# Patient Record
Sex: FEMALE | NOT HISPANIC OR LATINO | URBAN - METROPOLITAN AREA
[De-identification: names, ages, dates, MRNs, and addresses within clinical notes are randomized per-mention and may not be internally consistent; named-entity substitution may affect disease eponyms.]

---

## 2021-05-20 ENCOUNTER — IMPORTED ENCOUNTER (OUTPATIENT)
Dept: URBAN - METROPOLITAN AREA CLINIC 1 | Facility: CLINIC | Age: 41
End: 2021-05-20

## 2021-05-20 NOTE — PATIENT DISCUSSION
1.  Dry Eye OU -- Recommend ATs PRN. 2.  S/p LASIK OU. Return for an appointment in PR[place] with Dr. Bria Gardner.

## 2022-03-12 ASSESSMENT — KERATOMETRY
OS_AXISANGLE2_DEGREES: 101
OD_K1POWER_DIOPTERS: 44.25
OS_K1POWER_DIOPTERS: 45.00
OD_K2POWER_DIOPTERS: 45.00
OD_AXISANGLE_DEGREES: 172
OS_AXISANGLE_DEGREES: 011
OS_K2POWER_DIOPTERS: 45.50
OD_AXISANGLE2_DEGREES: 082

## 2022-03-12 ASSESSMENT — TONOMETRY
OS_IOP_MMHG: 13
OD_IOP_MMHG: 14

## 2022-03-12 ASSESSMENT — VISUAL ACUITY
OS_SC: J1+
OD_CC: 20/20
OD_SC: J1+
OS_CC: 20/20